# Patient Record
Sex: MALE | Race: WHITE
[De-identification: names, ages, dates, MRNs, and addresses within clinical notes are randomized per-mention and may not be internally consistent; named-entity substitution may affect disease eponyms.]

---

## 2018-03-10 ENCOUNTER — HOSPITAL ENCOUNTER (EMERGENCY)
Dept: HOSPITAL 80 - FED | Age: 48
Discharge: HOME | End: 2018-03-10
Payer: COMMERCIAL

## 2018-03-10 VITALS
DIASTOLIC BLOOD PRESSURE: 84 MMHG | OXYGEN SATURATION: 98 % | HEART RATE: 73 BPM | TEMPERATURE: 98.2 F | RESPIRATION RATE: 18 BRPM | SYSTOLIC BLOOD PRESSURE: 144 MMHG

## 2018-03-10 DIAGNOSIS — F17.200: ICD-10-CM

## 2018-03-10 DIAGNOSIS — G44.009: Primary | ICD-10-CM

## 2018-03-10 NOTE — EDPHY
H & P


Stated Complaint: needs sumatriptan refill


Time Seen by Provider: 03/10/18 17:20


HPI/ROS: 





CHIEF COMPLAINT:  Medication refill





HISTORY OF PRESENT ILLNESS:  Patient is a 47-year-old man with a history of 

cluster headaches who comes to the emergency department because he is running 

out of his sumatriptan prescription.  He cannot get an appointment at his 

primary for another week and half.  He states that when he gets these cluster 

headaches that tend to last for about 3-4 weeks and he has takes sumatriptan 

daily multiple times per day.  He is followed by Neurology.  He has not had a 

fever.  No trauma.  His headache consists of severe acute sharp pain blow his 

right eye and some tearing of his eye and nose.  This is typical.  No other 

complaints.








REVIEW OF SYSTEMS:


Constitutional:  denies: chills, fever, recent illness, recent injury


EENTM: denies: blurred vision, double vision, nose congestion


Respiratory: denies: cough, shortness of breath


Cardiac: denies: chest pain, irregular heart rate, lightheadedness, palpitations


Gastrointestinal/Abdominal: denies: abdominal pain, diarrhea, nausea, vomiting, 

blood streaked stools


Genitourinary: denies: dysuria, frequency, hematuria, pain


Musculoskeletal: denies: joint pain, muscle pain


Skin: denies: lesions, rash, jaundice, bruising


Neurological:  See HPI denies: numbness, paresthesia, tingling, dizziness, 

weakness


Hematologic/Lymphatic: denies: blood clots, easy bleeding, easy bruising


Immunologic/allergic: denies: HIV/AIDS, transplant








EXAM:


GENERAL:  Well-appearing, well-nourished and in no acute distress.


HEAD:  Atraumatic, normocephalic.


EYES:  Pupils equal round and reactive to light, extraocular movements intact, 

sclera anicteric, conjunctiva are normal.


ENT:  TMs normal, nares patent, oropharynx clear without exudates.  Moist 

mucous membranes.


NECK:  Normal range of motion, supple without lymphadenopathy or JVD.


LUNGS:  Breath sounds clear to auscultation bilaterally and equal.  No wheezes 

rales or rhonchi.


HEART:  Regular rate and rhythm without murmurs, rubs or gallops.


ABDOMEN:  Soft, nontender, normoactive bowel sounds.  No guarding, no rebound.  

No masses appreciated.


BACK:  No CVA tenderness, no spinal tenderness, step-offs or deformities


EXTREMITIES:  Normal range of motion, no pitting or edema.  No clubbing or 

cyanosis.


NEUROLOGICAL:  Cranial nerves II through XII grossly intact.  Normal speech, 

normal gait.  5/5 strength, normal movement in all extremities, normal sensation


PSYCH:  Normal mood, normal affect.


SKIN:  Warm, dry, normal turgor, no visible rashes or lesions.








Source: Patient


Exam Limitations: No limitations





- Personal History


Current Tetanus/Diphtheria Vaccine: Unsure


Current Tetanus Diphtheria and Acellular Pertussis (TDAP): Unsure





- Medical/Surgical History


Hx Asthma: No


Hx Chronic Respiratory Disease: No


Hx Diabetes: No


Hx Cardiac Disease: No


Hx Renal Disease: No


Hx Cirrhosis: No


Hx Alcoholism: No


Hx HIV/AIDS: No


Hx Splenectomy or Spleen Trauma: No


Other PMH: intermittent bilat wrist pain.  migraines, hyperlipidemia





- Family History


Significant Family History: No pertinent family hx





- Social History


Smoking Status: Current every day smoker


Alcohol Use: Sober


Drug Use: None


Constitutional: 


 Initial Vital Signs











Temperature (C)  36.8 C   03/10/18 16:07


 


Heart Rate  73   03/10/18 16:07


 


Respiratory Rate  18   03/10/18 16:07


 


Blood Pressure  144/84 H  03/10/18 16:07


 


O2 Sat (%)  98   03/10/18 16:07








 











O2 Delivery Mode               Room Air














Allergies/Adverse Reactions: 


 





No Known Allergies Allergy (Verified 03/10/18 16:05)


 








Home Medications: 














 Medication  Instructions  Recorded


 


guaiFENesin [Mucinex] 600 mg PO BID 03/11/16


 


Flonase Nasal Spray  03/10/18


 


Lidocaine  03/10/18


 


SUMAtriptan  03/10/18


 


SUMAtriptan [Imitrex 50 MG (*)] 50 mg PO Q2H #9 tab 03/10/18














Medical Decision Making


ED Course/Re-evaluation: 





I will refill the patient's sumatriptan until he can follow up with his 

primary.  He is happy with this plan.  He declines further workup or testing at 

this time.


Differential Diagnosis: 





Partial list of the Differential diagnosis considered include but were not 

limited to;  cluster headache, medication refill and although unlikely based on 

the history and physical exam, I also considered trauma, infection.  I 

discussed these differential diagnoses and the plan with the patient as well as 

the usual and expected course.  The patient understands that the diagnosis is 

provisional and that in medicine we are not always correct and that further 

workup is often warranted.  Usual and customary warnings were given.  All of 

the patient's questions were answered.  The patient was instructed to return to 

the emergency department should the symptoms at all worsen or return, otherwise 

to followup with the physician as we discussed.





Departure





- Departure


Disposition: Home, Routine, Self-Care


Clinical Impression: 


 Cluster headache syndrome





Condition: Fair


Instructions:  Cluster Headache (ED)


Referrals: 


NONE *PRIMARY CARE P,. [Primary Care Provider] - As per Instructions


Prescriptions: 


SUMAtriptan [Imitrex 50 MG (*)] 50 mg PO Q2H #9 tab